# Patient Record
Sex: MALE | Race: WHITE
[De-identification: names, ages, dates, MRNs, and addresses within clinical notes are randomized per-mention and may not be internally consistent; named-entity substitution may affect disease eponyms.]

---

## 2023-05-30 ENCOUNTER — HOSPITAL ENCOUNTER (EMERGENCY)
Dept: HOSPITAL 76 - ED | Age: 33
Discharge: HOME | End: 2023-05-30
Payer: COMMERCIAL

## 2023-05-30 VITALS — DIASTOLIC BLOOD PRESSURE: 81 MMHG | SYSTOLIC BLOOD PRESSURE: 147 MMHG
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
## 2023-05-30 DIAGNOSIS — S93.402A: Primary | ICD-10-CM

## 2023-05-30 PROCEDURE — 99282 EMERGENCY DEPT VISIT SF MDM: CPT

## 2023-05-30 PROCEDURE — 99283 EMERGENCY DEPT VISIT LOW MDM: CPT

## 2023-05-30 PROCEDURE — 73610 X-RAY EXAM OF ANKLE: CPT

## 2023-05-30 PROCEDURE — 1040M: CPT

## 2023-05-30 NOTE — ED PHYSICIAN DOCUMENTATION
PD HPI LOWER EXT INJURY





- Stated complaint


Stated Complaint: LT ANKLE INJ





- History obtained from


History obtained from: Patient





- History of Present Illness


PD HPI LOW EXT INJURY LOCATION: Left, Ankle


Type of injury: Twist ( Patient was stepping out of [employer] and slight uneven

ground.  He had a inversion injury of the left ankle with significant pain at 

that time.  Still hurting for weightbearing.  He works as a [employer] nurse.)


Where injury occurred: Work


Timing - onset: How many hours ago (3), Today


Timing - duration: Hours (3)


Timing - details: Abrupt onset, Still present


Worsened by: Moving, Other (walking)


Associated symptoms: No: Weakness, Numbness, Swelling


Similar symptoms before: Has not had sx before





Review of Systems


Skin: denies: Abrasion (s), Laceration (s)





PD PAST MEDICAL HISTORY





- Present Medications


Home Medications: 


                                Ambulatory Orders











 Medication  Instructions  Recorded  Confirmed


 


No Known Home Medications  05/30/23 05/30/23














- Allergies


Allergies/Adverse Reactions: 


                                    Allergies











Allergy/AdvReac Type Severity Reaction Status Date / Time


 


No Known Drug Allergies Allergy   Verified 05/30/23 07:39














PD ED PE NORMAL





- Vitals


Vital signs reviewed: Yes





- General


General: Alert and oriented X 3, No acute distress, Well developed/nourished





- Derm


Derm: Normal color, Warm and dry





- Extremities


Extremities: Other (The left ankle is tender along the lateral malleolus.  It is

not tender inferiorly.  Achilles is firm and nontender.  Medial nontender.  Good

color sensation and capillary refill in the toes.)





- Neuro


Neuro: No motor deficit, No sensory deficit





Results





- Vitals


Vitals: 


                               Vital Signs - 24 hr











  05/30/23





  07:37


 


Temperature 36.7 C


 


Heart Rate 67


 


Respiratory 16





Rate 


 


Blood Pressure 147/81 H


 


O2 Saturation 100








                                     Oxygen











O2 Source                      Room air

















- Rads (name of study)


  ** left ankle


Relevant Findings:: EMP independent interpretation of test (no fractures)





PD Medical Decision Making





- ED course


Complexity details: reviewed results (no fractures. ), considered differential 

(Inversion injury of the left ankle with pain at the lateral malleolus.  We will

get an x-ray.  Supportive treatment with splint or brace based on the results. 

He does not feel he needs crutches.), d/w patient





Departure





- Departure


Disposition: 01 Home, Self Care


Clinical Impression: 


Ankle sprain


Qualifiers:


 Encounter type: initial encounter Involved ligament of ankle: unspecified 

ligament Laterality: left Qualified Code(s): S93.402A - Sprain of unspecified 

ligament of left ankle, initial encounter





Condition: Stable


Record reviewed to determine appropriate education?: Yes


Instructions:  ED Sprain Ankle


Comments: 


I do not see any fractures on your x-ray.  A sprain can still take several weeks

to heal and you will want to have the ankle braced when up and around during 

that timeframe.





Ice elevate and rest often initially for swelling.





Tylenol ibuprofen or similar if needed for pains.


Forms:  Activity restrictions

## 2023-05-30 NOTE — XRAY REPORT
PROCEDURE:  Ankle 3 View LT

 

INDICATIONS:  inversion injury lateral ankle pain

 

TECHNIQUE:  3 views of the ankle were acquired.  

 

COMPARISON:  None.

 

FINDINGS:  

 

Bones:  No fractures or dislocations.  Slight widening of lateral ankle mortise is seen. No suspiciou
s bony lesions.  

 

Soft tissues:  No tibiotalar joint effusion.  Achilles tendon appears normal.  

 

IMPRESSION:  

No acute ankle fracture or dislocation. Slight widening of lateral ankle mortise concerning for synde
smotic injury. If indicated, MRI of ankle can be done for further evaluation of internal derangement.


 

Reviewed by: Ricky Borges MD on 5/30/2023 8:35 AM PDT

Approved by: Ricky Borges MD on 5/30/2023 8:35 AM PDT

 

 

Station ID:  [id number]